# Patient Record
Sex: FEMALE | Race: WHITE
[De-identification: names, ages, dates, MRNs, and addresses within clinical notes are randomized per-mention and may not be internally consistent; named-entity substitution may affect disease eponyms.]

---

## 2019-06-10 NOTE — ED
GI/ HPI





- HPI Summary


HPI Summary: 


49-year-old female presents with lower abdominal pain. for the past week.  pain 

has been increasing.    States she's been having loose stool.  She admits to of 

decreased appetite.  Admits to some urgency and frequency but no dysuria.  She 

has been having nausea but no vomiting.  No hematuria.  Never this pain before.

  No previous surgeries.  Has no medical conditions.  tried Pepto-Bismol for 

her symptoms today.  





- History of Current Complaint


Chief Complaint: EDAbdPain


Time Seen by Provider: 06/10/19 17:56


Stated Complaint: ABD PAIN PER PT


Hx Last Menstrual Period: hysterectomy


Pain Intensity: 8





- Allergy/Home Medications


Allergies/Adverse Reactions: 


 Allergies











Allergy/AdvReac Type Severity Reaction Status Date / Time


 


No Known Allergies Allergy   Verified 06/10/19 14:42














PMH/Surg Hx/FS Hx/Imm Hx


Endocrine/Hematology History: 


   Denies: Hx Diabetes, Hx Thyroid Disease


Cardiovascular History: 


   Denies: Hx Hypertension, Hx Pacemaker/ICD


Respiratory History: 


   Denies: Hx Asthma, Hx Chronic Obstructive Pulmonary Disease (COPD)


GI History: 


   Denies: Hx Ulcer


 History: 


   Denies: Hx Dialysis, Hx Renal Disease


Sensory History: 


   Denies: Hx Hearing Aid


Psychiatric History: 


   Denies: Hx Panic Disorder





- Cancer History


Hx Chemotherapy: No


Hx Radiation Therapy: No





- Surgical History


Surgery Procedure, Year, and Place: left wrist repair 2012.  hysterecomy


Infectious Disease History: No


Infectious Disease History: 


   Denies: Hx Hepatitis, Hx Human Immunodeficiency Virus (HIV), Traveled 

Outside the [place] in Last 30 Days





- Family History


Known Family History: Positive: Hypertension





- Social History


Alcohol Use: Weekly


Substance Use Type: Reports: None


Smoking Status (MU): Never Smoked Tobacco





Review of Systems


Negative: Fever


Negative: Chest Pain


Negative: Shortness Of Breath


Positive: Abdominal Pain, Diarrhea, Nausea.  Negative: Vomiting


All Other Systems Reviewed And Are Negative: Yes





Physical Exam


Triage Information Reviewed: Yes


Vital Signs On Initial Exam: 


 Initial Vitals











Temp Pulse Resp BP Pulse Ox


 


 98.5 F   79   18   112/81   99 


 


 06/10/19 17:08  06/10/19 17:08  06/10/19 17:08  06/10/19 17:08  06/10/19 17:08











Vital Signs Reviewed: Yes


Appearance: Positive: Well-Appearing


Skin: Positive: Warm, Dry


Head/Face: Positive: Normal Head/Face Inspection


Eyes: Positive: Normal, Conjunctiva Clear


ENT: Positive: Pharynx normal


Respiratory/Lung Sounds: Positive: Clear to Auscultation, Breath Sounds Present


Cardiovascular: Positive: Normal, RRR


Abdomen Description: Positive: Soft, Other: - tenderness LLQ


Bowel Sounds: Positive: Present


Musculoskeletal: Positive: Normal


Neurological: Positive: Normal


Psychiatric: Positive: Normal





Diagnostics





- Vital Signs


 Vital Signs











  Temp Pulse Resp BP Pulse Ox


 


 06/10/19 18:00   79    100


 


 06/10/19 17:53   76    99


 


 06/10/19 17:08  98.5 F  79  18  112/81  99














- Laboratory


Lab Results: 


 Lab Results











  06/10/19 06/10/19 06/10/19 Range/Units





  18:15 18:15 18:15 


 


WBC  10.0    (3.5-10.8)  10^3/uL


 


RBC  4.20    (3.70-4.87)  10^6 /uL


 


Hgb  13.2    (12.0-16.0)  g/dL


 


Hct  39    (35-47)  %


 


MCV  93    (80-97)  fL


 


MCH  31    (27-31)  pg


 


MCHC  34    (31-36)  g/dL


 


RDW  14    (10-15)  %


 


Plt Count  229    (150-450)  10^3/uL


 


MPV  8.6    (7.4-10.4)  fL


 


Neut % (Auto)  76.6    %


 


Lymph % (Auto)  14.9    %


 


Mono % (Auto)  7.4    %


 


Eos % (Auto)  0.6    %


 


Baso % (Auto)  0.5    %


 


Absolute Neuts (auto)  7.7    (1.5-7.7)  10^3/ul


 


Absolute Lymphs (auto)  1.5    (1.0-4.8)  10^3/ul


 


Absolute Monos (auto)  0.7    (0-0.8)  10^3/ul


 


Absolute Eos (auto)  0.1    (0-0.6)  10^3/ul


 


Absolute Basos (auto)  0.1    (0-0.2)  10^3/ul


 


Absolute Nucleated RBC  0.0    10^3/ul


 


Nucleated RBC %  0.0    


 


Sodium   136   (135-145)  mmol/L


 


Potassium   3.6   (3.5-5.0)  mmol/L


 


Chloride   100 L   (101-111)  mmol/L


 


Carbon Dioxide   30   (22-32)  mmol/L


 


Anion Gap   6   (2-11)  mmol/L


 


BUN   10   (6-24)  mg/dL


 


Creatinine   0.68   (0.51-0.95)  mg/dL


 


Est GFR ( Amer)   110.4   (>60)  


 


Est GFR (Non-Af Amer)   91.2   (>60)  


 


BUN/Creatinine Ratio   14.7   (8-20)  


 


Glucose   77   ()  mg/dL


 


Lactic Acid    0.5  (0.5-2.0)  mmol/L


 


Calcium   10.2   (8.6-10.3)  mg/dL


 


Total Bilirubin   0.80   (0.2-1.0)  mg/dL


 


AST   14   (13-39)  U/L


 


ALT   10   (7-52)  U/L


 


Alkaline Phosphatase   53   ()  U/L


 


C-Reactive Protein   32.57 H   (<8.01)  mg/L


 


Total Protein   7.8   (6.4-8.9)  g/dL


 


Albumin   4.5   (3.2-5.2)  g/dL


 


Globulin   3.3   (2-4)  g/dL


 


Albumin/Globulin Ratio   1.4   (1-3)  


 


Lipase   15   (11.0-82.0)  U/L











Result Diagrams: 


 06/10/19 18:15





 06/10/19 18:15


Lab Statement: Any lab studies that have been ordered have been reviewed, and 

results considered in the medical decision making process.





- CT


  ** abd


CT Interpretation Completed By: Radiologist


Summary of CT Findings: IMPRESSION:  1. Proximal sigmoid colon diverticulitis. 

No perforation or abscess.  2. Bosniak type I renal cyst. No followup indicated.





Re-Evaluation





- Re-Evaluation


  ** First Eval


Re-Evaluation Time: 17:50


Change: Improved


Comment: feeling better





  ** Second Eval


Re-Evaluation Time: 21:45


Comment: discussed results





GIGU Course/Dx





- Course


Course Of Treatment: 49-year-old female presents with lower abdominal pain. for 

the past week.  pain has been increasing.    States she's been having loose 

stool.  She admits to of decreased appetite.  Admits to some urgency and 

frequency but no dysuria.  She has been having nausea but no vomiting.  No 

hematuria.  Never this pain before.  No previous surgeries.  Has no medical 

conditions.  tried Pepto-Bismol for her symptoms today.  On exam tenderness 

left lower quadrant.  White blood count normal.  CRP elevated. urine normal. CT 

shows diverticulitis. will treat with augmentin and flagyl. patient understand 

and agrees with plan.





- Diagnoses


Differential Diagnoses - Female: Appendicitis, Diverticulitis, Urinary Tract 

Infection


Provider Diagnoses: 


 Diverticulitis








Discharge





- Sign-Out/Discharge


Documenting (check all that apply): Patient Departure


Patient Received Moderate/Deep Sedation with Procedure: No





- Discharge Plan


Condition: Good


Disposition: HOME


Prescriptions: 


Amoxicillin/Clavulanate TAB* [Augmentin *] 875 mg PO BID #19 tab


metroNIDAZOLE [Flagyl 500 MG TAB] 500 mg PO TID #29 tab


Ondansetron ODT TAB* [Zofran 4 MG Odt TAB*] 4 mg PO Q6H PRN #16 tab.odt


 PRN Reason: Nausea


Patient Education Materials:  Diverticulitis (ED), Diverticulitis Diet (ED)


Referrals: 


Omega Whitt MD [Primary Care Provider] - 


Additional Instructions: 


Take augmentin twice a day for 10 days, first dose given in ED


Take Flagyl every 8 hours for 10 days, first dose given in ED


Take Zofran every 6 hours for nausea


Follow clear liquid diet until symptoms improve


Return to ED if unable to keep anything down, develop fever, or any new or 

worsening symptoms





- Billing Disposition and Condition


Condition: GOOD


Disposition: Home

## 2019-06-10 NOTE — UC
Abdominal Pain Female HPI





- HPI Summary


HPI Summary: 





50 yo female presents with lower abdominal pain for 1 week. She tells me that 

her pain started lower abdominal and was mild in nature. Yesterday and today 

her pain has significantly worsened. She reports 3-4 episodes of loose stools 

each day throughout the week. She states she has had UTIs in the past that have 

felt similar, but she is not having any urinary burning, frequency, or 

pressure. She is eating and drinking well, but does feel a little nauseous. 

Denies SOB, chest pain, vomiting, blood in stool, dysuria, flank pain, vaginal 

bleeding or discharge. She has had a hysterectomy, but still has both ovaries. 











- History of Current Complaint


Chief Complaint: UCAbdominalPain


Stated Complaint: SEVERE STOMACH PAIN


Time Seen by Provider: 06/10/19 14:59


Hx Last Menstrual Period: hysterectomy


Onset/Duration: Gradual Onset


Severity Initially: Mild


Severity Currently: Moderate


Pain Intensity: 6


Pain Scale Used: 0-10 Numeric


Allergies/Adverse Reactions: 


 Allergies











Allergy/AdvReac Type Severity Reaction Status Date / Time


 


No Known Allergies Allergy   Verified 06/10/19 14:42











Home Medications: 


 Home Medications





Bismuth Subsalicylate [Pepto-Bismol] 3 tab PO ONCE PRN 06/10/19 [History 

Confirmed 06/10/19]











PMH/Surg Hx/FS Hx/Imm Hx





- Additional Past Medical History


Additional PMH: 





None





- Surgical History


Surgical History: Yes


Surgery Procedure, Year, and Place: left wrist repair 2012.  hysterecomy





- Family History


Known Family History: Positive: Hypertension





- Social History


Alcohol Use: Weekly


Substance Use Type: None


Smoking Status (MU): Never Smoked Tobacco





Review of Systems


All Other Systems Reviewed And Are Negative: Yes


Constitutional: Positive: Negative


Skin: Positive: Negative


Respiratory: Positive: Negative


Cardiovascular: Positive: Negative


Gastrointestinal: Positive: Abdominal Pain, Diarrhea, Nausea


Genitourinary: Positive: Negative


Neurovascular: Positive: Negative


Neurological: Positive: Negative


Psychological: Positive: Negative





Physical Exam





- Summary


Physical Exam Summary: 





GENERAL: NAD. WDWN. No pain distress.


SKIN: No rashes, sores, lesions, or open wounds.


NECK: Supple. Nontender. No lymphadenopathy. 


CHEST:  CTAB. No r/r/w. No accessory muscle use. Breathing comfortably and in 

no distress.


CV:  RRR. Without m/r/g. Pulses intact. Cap refill <2seconds


ABDOMEN: Mild TTP LLQ, superpubic, and RLQ. Soft. No distention or guarding. No 

CVA tenderness. Bowel sounds present


NEURO: Alert. 


PSYCH: Age appropriate behavior.


Triage Information Reviewed: Yes


Vital Signs: 


 Initial Vital Signs











Temp  98 F   06/10/19 14:37


 


Pulse  71   06/10/19 14:37


 


Resp  14   06/10/19 14:37


 


BP  113/79   06/10/19 14:37


 


Pulse Ox  100   06/10/19 14:37








 Laboratory Tests











  06/10/19





  14:53


 


POC Urine Color  Light yellow


 


POC Urine Clarity  Clear


 


POC Urine pH  5.5


 


POC Ur Specif Gravity  <= 1.005 L


 


POC Urine Protein  Negative


 


POC Ur Glucose (UA)  Negative


 


POC Urine Ketones  Negative


 


POC Urine Blood  Trace-lysed A


 


POC Urine Nitrite  Negative


 


POC Urine Bilirubin  Negative


 


POC Urine Urobilinogen  0.2


 


POC U Leukocyte Esteras  Negative











Vital Signs Reviewed: Yes





Abd Pain Female Course/Dx





- Course


Course Of Treatment: 





U/S: 


IMPRESSION:


1. STATUS POST HYSTERECTOMY.


2. SMALL CYSTS WITHIN THE RIGHT OVARY MOST CONSISTENT WITH FOLLICULAR CYSTS.





Discussed results with pt. Given that she is having loose stools and nausea, I 

am not confident that these ovarian cysts are the sole cause of her discomfort 

- therefore I have recommended that she go to the ED for further evaluation of 

her abdominal pain as I believe she requires a CT scan with po contrast. Pt was 

agreeable with this.





- Differential Dx/Diagnosis


Provider Diagnosis: 


 Lower abdominal pain, Loose stools








Discharge





- Sign-Out/Discharge


Documenting (check all that apply): Patient Departure


All imaging exams completed and their final reports reviewed: Yes





- Discharge Plan


Condition: Stable


Disposition: HOME-RECOMMEND TO ED


Referrals: 


Omega Whitt MD [Primary Care Provider] - 


Additional Instructions: 


Your ultrasound showed an ovarian cyst. This could be the cause of your pain, 

but your symptoms seem to support a potential GI pathology - therefore I 

recommend that you go to the ER for further evaluation and a more appropriate 

work up of your abdominal pain. This may include a CT scan with oral and IV 

contrast.





- Billing Disposition and Condition


Condition: STABLE


Disposition: Home-Recommend to ED

## 2019-07-04 ENCOUNTER — HOSPITAL ENCOUNTER (EMERGENCY)
Dept: HOSPITAL 25 - UCEAST | Age: 49
Discharge: HOME | End: 2019-07-04
Payer: COMMERCIAL

## 2019-07-04 VITALS — DIASTOLIC BLOOD PRESSURE: 76 MMHG | SYSTOLIC BLOOD PRESSURE: 109 MMHG

## 2019-07-04 DIAGNOSIS — R05: Primary | ICD-10-CM

## 2019-07-04 PROCEDURE — G0463 HOSPITAL OUTPT CLINIC VISIT: HCPCS

## 2019-07-04 PROCEDURE — 99212 OFFICE O/P EST SF 10 MIN: CPT

## 2019-07-04 NOTE — XMS REPORT
Continuity of Care Document (CCD)

 Created on:2019



Patient:Danny Pate

Sex:Female

:1970

External Reference #:[id number]





Demographics







 Address  [address] [place] [address]

 

 Mobile Phone  [phone]

 

 Email Address  [email or web address]

 

 Preferred Language  en

 

 Marital Status  Not  or 

 

 Adventism Affiliation  Unknown

 

 Race  White

 

 Ethnic Group  Not  or 









Author







 Name  Kathy Cunningham









Care Team Providers







 Name  Role  Phone

 

 Omega Whitt MD  Primary Care Physician  Unavailable









Payers







 Date  Identification Numbers  Payment Provider  Subscriber

 

   Policy Number: [id number]  Gary Pate









 PayID: [id number]  [address]









 [place] [address]







Problems







 Active Problems  Provider  Date

 

 Uterine leiomyoma  Elizabeth Acosta N.PSarah  Onset: 2016







Family History







 Date  Family Member(s)  Observation  Comments

 

   General  Arthritis  

 

   Father  Hypertension  Age 67

 

   Father  PGF - stroke  

 

   Mother  Alive And Well  Age 67

 

   Siblings  None  







Social History







 Type  Date  Description  Comments

 

 Birth Sex    Unknown  

 

 Marital Status      

 

 Lives With      

 

 Occupation      

 

 ETOH Use    Currently consumes alcohol  2 - 6 per week

 

 Tobacco Use  Start: Unknown  Patient has never smoked  

 

 Recreational Drug Use    Negative For Denies Drug  



     Use  

 

 Smoking Status  Reviewed: 19  Patient has never smoked  

 

 Exercise Type/Frequency    Exercises sporadically  







Allergies, Adverse Reactions, Alerts







 Description

 

 No Known Drug Allergies







Medications







 Active Medications  SIG  Qnty  Indications  Ordering  Date



         Provider  

 

 Metronidazole  take 1 tablet by      Unknown  



             500mg  mouth three times a        



 Tablets  day for 10 days        



           

 

 Amoxicillin/Clavulana  take 1 tablet by      Unknown  



 te Potassium  mouth twice a day        



            875-125mg  for 10 days        



 Tablets          



           

 

 Vitamin B 12  1 sl every day      Unknown  



            100mcg          



 Lozenges          



           

 

 Magnesium  1-2 by mouth every      Unknown  



         300mg  day        



 Capsules          



           

 

 Hot Flash Relief  2 Tablets Twice A      Unknown  



 500MG  Day        

 

 Deep Rest        Unknown  

 

 Black Cohosh  1 by mouth every day      Unknown  



            40mg          



 Capsules          



           

 

 Ibuprofen  by mouth three times      Unknown  



         800mg Tablets  a day as needed        



           

 

 Colace  2 caps po twice      Unknown  



      100mg Capsules  daily        



           

 

 Vitamin D3  twice a week      Unknown  



          5000Unit/ML          



 Liquid          



           

 

 Omega III  1 by mouth      Unknown  



         1000mg  occasionally        



 Capsules          



           









 History Medications









 Lact-Enz        Unknown   - 2016

 

 Utrophin        Unknown   - 2016

 

 Cardio Complete        Unknown   - 2016



          Capsules          



           

 

 Oxycodone-Acetaminophen  take 1 to 2 tablets by      Unknown   - 2019



                 5-325mg  mouth every 6 to 8        



 Tablets  hours as needed pain        

 

 Megestrol  1 by mouth bid      Unknown   - 2016



   40mg Tablets          



           

 

 Intelligent  Blend  1 by mouth daily (on      Unknown   - 2016



             Tablets  hold)        



           

 

 Progesterone Micronized  1 by mouth every day      Unknown   - 2016



                 85G  Day 12-26        



 Capsules          

 

 Smooth Cycle  1 po qd      Unknown   - 2016

 

 B12-Active  1 po qd      Unknown   - 2016

 

 Iron  1 by mouth every day      Unknown   - 2019



 325(65Fe) mg Tablets          



           

 

 Oxycodone-Acetaminophen        Unknown   - 2016



                 5-325mg          



 Tablets          

 

 Sulfamethoxazole/Trimethopr        Unknown   - Unknown



 im DS          



 800-160mg Tablets          



           







Vital Signs







 Date  Vital  Result  Comment

 

 2019  1:33pm  Height  64 inches  5'4"









 Weight  118.00 lb  

 

 Heart Rate  68 /min  

 

 BP Systolic  113 mmHg  

 

 BP Diastolic  69 mmHg  

 

 O2 % BldC Oximetry  97 %  

 

 BMI (Body Mass Index)  20.3 kg/m2  









 2019  2:45pm  Height  64 inches  5'4"









 Weight  116.00 lb  

 

 Heart Rate  59 /min  

 

 BP Systolic  141 mmHg  

 

 BP Diastolic  92 mmHg  

 

 Body Temperature  97.7 F  

 

 O2 % BldC Oximetry  100 %  

 

 BMI (Body Mass Index)  19.9 kg/m2  









 2016  9:34am  Height  64 inches  5'4"









 Weight  137.75 lb  w/shoes

 

 Heart Rate  84 /min  

 

 BP Systolic Sitting  96 mmHg  Ra reg cuff

 

 BP Diastolic Sitting  72 mmHg  Ra reg cuff

 

 BMI (Body Mass Index)  23.6 kg/m2  

 

 Ejection Fraction  55-60%  Echo 3/18/16









 2016 10:02am  Height  64 inches  5'4"









 Weight  143.00 lb  

 

 Heart Rate  84 /min  

 

 BP Systolic Sitting  100 mmHg  

 

 BP Diastolic Sitting  70 mmHg  

 

 Respiratory Rate  14 /min  

 

 Pain Level  2  

 

 BMI (Body Mass Index)  24.5 kg/m2  









 2016 10:59am  Height  64 inches  5'4"









 Weight  140.12 lb  

 

 Heart Rate  84 /min  

 

 BP Systolic Sitting  104 mmHg  

 

 BP Diastolic Sitting  70 mmHg  

 

 Pain Level  3  

 

 BMI (Body Mass Index)  24.0 kg/m2  









 2016  8:43am  Height  67 inches  5'7"









 Weight  139.00 lb  with shoes

 

 Heart Rate  78 /min  

 

 BP Systolic Sitting  57215 mmHg  reg cuff LA

 

 BMI (Body Mass Index)  21.8 kg/m2  









 2016  3:24pm  Height  67 inches  5'7"









 Weight  137.00 lb  

 

 Heart Rate  76 /min  

 

 BP Systolic Sitting  112 mmHg  

 

 BP Diastolic Sitting  82 mmHg  

 

 BMI (Body Mass Index)  21.5 kg/m2  









 2016  9:57am  Weight  137.00 lb  









 Heart Rate  70 /min  

 

 BP Systolic Sitting  110 mmHg  

 

 BP Diastolic Sitting  62 mmHg  

 

 Respiratory Rate  15 /min  

 

 Body Temperature  98.2 F  

 

 O2 % BldC Oximetry  98 %  







Results







 Test  Date  Facility  Test  Result  H/L  Range  Note

 

 Laboratory test  2019  [facility]  Gardnerella/Y  <pending>  
    



 finding    [address]: Vaginal        



     [place] [address]  Dna        



     [phone]          

 

 Urine Culture And  06/10/2019  [facility]  Urine Culture  SEE 
RESULT      1, 2



 Sensitivities    [address]    [place]ELOW      



     [place] [address] [phone]          

 

 Urinalysis Profile  06/10/2019  [facility]  Urine Color  Straw      



     [address]          



     [place] [address] [phone]          









 Urine Appearance  Clear      

 

 Urine Specific Gravity  1.004  Low  1.010-1.030  

 

 Urine pH  6.0  N  5-9  

 

 Urine Urobilinogen  Negative    Negative  

 

 Urine Ketones  Negative    Negative  

 

 Urine Protein  Negative    Negative  

 

 Urine Leukocytes  Negative    Negative  

 

 Urine Blood  1+  Abnormal  Negative  

 

 Urine Nitrite  Negative    Negative  

 

 Urine Bilirubin  Negative    Negative  

 

 Urine Glucose  Negative    Negative  

 

 Urine White Blood Cell  Absent    Absent  

 

 Urine Red Blood Cell  Trace(0-2/hpf)    Absent  

 

 Urine Bacteria  1+  Abnormal  Absent  

 

 Urine Squamous Epithelial Cell  Present  Abnormal  Absent  









 Laboratory test finding  06/10/2019  [facility]  Lipase  15 U/L  N  
11.0-82.0  



     [address]          



     [place] [address] [phone]          









 C Reactive Protein  32.57 mg/L  High  <8.01  









 Comp Metabolic Panel  06/10/2019  [facility]  Sodium  136 mmol/L  N
  135-145  



     [address] [place] [address] [phone]          









 Potassium  3.6 mmol/L  N  3.5-5.0  

 

 Chloride  100 mmol/L  Low  101-111  

 

 Co2 Carbon Dioxide  30 mmol/L  N  22-32  

 

 Anion Gap  6 mmol/L  N  2-11  

 

 Glucose  77 mg/dL  N    

 

 Blood Urea Nitrogen  10 mg/dL  N  6-24  

 

 Creatinine  0.68 mg/dL  N  0.51-0.95  

 

 BUN/Creatinine Ratio  14.7  N  8-20  

 

 Calcium  10.2 mg/dL  N  8.6-10.3  

 

 Total Protein  7.8 g/dL  N  6.4-8.9  

 

 Albumin  4.5 g/dL  N  3.2-5.2  

 

 Globulin  3.3 g/dL  N  2-4  

 

 Albumin/Globulin Ratio  1.4  N  1-3  

 

 Total Bilirubin  0.80 mg/dL  N  0.2-1.0  

 

 Alkaline Phosphatase  53 U/L  N    

 

 Alt  10 U/L  N  7-52  

 

 Ast  14 U/L  N  13-39  

 

 Egfr Non-  91.2    >60  

 

 Egfr   110.4    >60  3









 Laboratory test  06/10/2019  [facility]  Lactic Acid  0.5 mmol/L  N
  0.5-2.0  4



 finding    [address] [place] [address] [phone]          

 

 CBC Auto Diff  06/10/2019  [facility]  White Blood  10.0 10^3/uL  N
  3.5-10.8  



     [address]  [facility]  Count        



     [place] [address] [phone]          









 Red Blood Count  4.20 10^6/uL  N  3.70-4.87  

 

 Hemoglobin  13.2 g/dL  N  12.0-16.0  

 

 Hematocrit  39 %  N  35-47  

 

 Mean Corpuscular Volume  93 fL  N  80-97  

 

 Mean Corpuscular Hemoglobin  31 pg  N  27-31  

 

 Mean Corpuscular HGB Conc  34 g/dL  N  31-36  

 

 Red Cell Distribution Width  14 %  N  10-15  

 

 Platelet Count  229 10^3/uL  N  150-450  

 

 Mean Platelet Volume  8.6 fL  N  7.4-10.4  

 

 Abs Neutrophils  7.7 10^3/uL  N  1.5-7.7  

 

 Abs Lymphocytes  1.5 10^3/uL  N  1.0-4.8  

 

 Abs Monocytes  0.7 10^3/uL  N  0-0.8  

 

 Abs Eosinophils  0.1 10^3/uL  N  0-0.6  

 

 Abs Basophils  0.1 10^3/uL  N  0-0.2  

 

 Abs Nucleated RBC  0.0 10^3/uL      

 

 Granulocyte %  76.6 %      

 

 Lymphocyte %  14.9 %      

 

 Monocyte %  7.4 %      

 

 Eosinophil %  0.6 %      

 

 Basophil %  0.5 %      

 

 Nucleated Red Blood Cells %  0.0      









 Connective Tissue  2016  [facility]  Anti-Nuclear Antibody  
0.7 U  N    5



 Panel    [address] [place] [address] [phone]          









 Cyclic Citrullinated Peptide  <15.6 U  N    6

 

 Interpretation  See Comment  N    7









 Laboratory test  2016  [facility]  Ferritin  < 10.0 ng/mL  
Low    



 finding    [address] [place] [address] [phone]          









 Folic Acid (Folate)  > 20.00 ng/mL  N  >3.99  

 

 Vitamin B12  446 pg/mL  N  180-914  8









 Iron & Iron Binding  2016  [facility]  Iron  53 g/dL  N  50-
212  



 Capacity    [address] [place] [address] [phone]          









 Unsaturated Iron Binding  458 g/dL  N    

 

 Total Iron Binding Capacity  511 g/dL  High  250-450  

 

 % Iron Saturation  10 %  Low  15-55  









 Laboratory test  2016  [facility]  LDH  131 U/L  Low  140-271
  



 finding    [address] [place] [address] [phone]          

 

 Retic Count  2016  [facility]  Retic Count  0.9 %  N  0.5-1.5
  



     [address] [place] [address] [phone]          









 Corrected Retic Count  0.6 %  N  0.5-1.5  

 

 Maturation Factor Retic  1.5  N    

 

 Retic Index  0.40  N    

 

 Mean Retic Volume  111.0  N    

 

 Immature Retic Fraction  0.36  N    

 

 RBC Retic Count  3.50 10^6/uL  Low  4.6-6.2  

 

 Hematocrit for Retic CNT  31 %  Low  35-47  









 Anti-Thyroid  2016  [facility]  Thyroperoxidase AB  0.00 IU/
mL  N  <9  9



 Antibodies Screen    [address]          



     [place] [address] [phone]          









 Thyroglobulin AB  <1.8 IU/mL  N  <4.0  10









 Protein  2016  [facility]  Total  8.0  Abnormal  6.3 -  



 Electrophoresis    [address]  Protein(Pep)  g/dL    7.9  



     [place] [address] [phone]          









 Albumin  4.0 g/dL  N  3.4-4.7  

 

 Alpha-1 Globulin  0.2 g/dL  N  0.1-0.3  

 

 Alpha-2 Globulin  1.0 g/dL  N  0.6-1.0  

 

 Beta Globulin  1.2 g/dL  N  0.7-1.2  

 

 Gamma Globulin  1.6 g/dL  N  0.6-1.6  

 

 Albumin/Globulin Ratio  1.00  N    

 

 Impression  See Comment  N    11









 Laboratory test  2016  [facility]  Angiotensin  18 U/L  N  8 
- 53  12



 finding    [address]  Converting Enzyme        



     [place], [place] [address] [phone]          

 

 Celiac Panel  2016  [facility]  Tissue  <1.2  N    13



     [address]  Transglutaminase IgA  U/mL      



     [place] [address]  Ab        



     [phone]          









 Immunoglobulin A  359 mg/dL  Abnormal  61 - 356  

 

 Celiac Interpretation  See Comment  N    14









 Laboratory test  2016  [facility]  Serotonin Serum  127  N  <=
230  15



 finding    [address]    ng/mL      



     [place], [place] [address] [phone]          

 

 Scleroderma AB  2016  [facility]  Scleroderma Ab  2.2 U  
Abnormal    16



 (SCL70)    [address]          



     [place] [address] [phone]          

 

 Laboratory test  2016  [facility]  Erythrocyte Sed  24 mm/Hr  
High  0-14  



 finding    [address]  Rate        



     [place] [address] [phone]          









 Creatine Kinase(CK)  59 U/L  N    

 

 C Reactive Protein  1.26 mg/L  N  < 5.00  17









 Neutrophil Cytoplasmic  2016  [facility]  C-Anca  Negative  N
  Negative  



 AB    [address]          



     [place] [address] [phone]          









 P Anca  Negative  N  Negative  

 

 Anca Reviewed By MD  Ignacio Sudilovsk <SEE NOTE>  N    18









 Cardiolipin  2016  [facility]  Phospholipid Ab  < 4.0 MPL  N 
   19



 Igg/Igm    [address]  IgM, S        



     [place], [place] [address] [phone]          









 Phospholipid Ab IgG  6.4 GPL  N    20









 1  Pt was evaluated in ER , had diverticulitis.

 

 2  SEE RESULT BELOW



   -----------------------------------------------------------------------------
---------------



   Name:  DANNY PATE              : 1970    Attend Dr: Woodrow Arrieta MD



   Acct:  [id number]  Unit: [id number]  AGE: 49            Location:  [facility]



   Re/10/19                        SEX: F             Status:    DEP ER



   -----------------------------------------------------------------------------
---------------



   



   SPEC: [id number]         ISA:   06/10/    REKHA DR: Lilia LOONEY



   REQ:  [id number]              RECD:   06/10/



   STATUS: AMA DE [place] ROS[place] DR: Erika Whitt MD PC



   Woodrow Arrieta MD



   _



   SOURCE: URINE          SPDESC:



   ORDERED:  Urine Culture



   



   -----------------------------------------------------------------------------
---------------



   Procedure                         Result                         Reported   
        Site



   -----------------------------------------------------------------------------
---------------



   Urine Culture  Final                                             19-
1614      ML



   No Growth (<1,000 CFU/mL)



   



   -----------------------------------------------------------------------------
---------------



   * ML - Main Lab



   .



   



   



   



   



   



   



   



   



   



   



   



   



   



   



   



   



   



   



   



   



   



   



   



   



   



   ** END OF REPORT **



   



   DEPARTMENT OF PATHOLOGY,  [address] [place] [address]



   Phone # [phone]      Fax #[phone]



   Ignacio Cole M.D. Director     [facility] # [id number]

 

 3  *******Because ethnic data is not always readily available,



   this report includes an eGFR for both -Americans and



   non- Americans.****



   The National Kidney Disease Education Program (NKDEP) does



   not endorse the use of the MDRD equation for patients that



   are not between the ages of 18 and 70, are pregnant, have



   extremes of body size, muscle mass, or nutritional status,



   or are non- or non-.



   According to the National Kidney Foundation, irrespective of



   diagnosis, the stage of the disease is based on the level of



   kidney function:



   Stage Description                      GFR(mL/min/1.73 m(2))



   1     Kidney damage with normal or decreased GFR       90



   2     Kidney damage with mild decrease in GFR          60-89



   3     Moderate decrease in GFR                         30-59



   4     Severe decrease in GFR                           15-29



   5     Kidney failure                       <15 (or dialysis)

 

 4  [facility] Severe Sepsis and Septic Shock Management Bundle Measure



   requires all lactic acids initially measuring >2.0 mmol/L be



   repeated.

 

 5  -------------------REFERENCE VALUE--------------------------



   <=1.0 (Negative)

 

 6  -------------------REFERENCE VALUE--------------------------



   <20.0 (Negative)

 

 7  Tests for antibodies to dsDNA and CALVIN antigens are not



   performed automatically unless the VIOLETA result is > or=



   3.0 U.  Studies performed at [facility] indicate that



   positive VIOLETA results <3.0 U are rarely accompanied by



   positive second order tests.



   Test Performed by:



   [place] [address]



   : Pavel Shah II, M.D., Ph.D.

 

 8  Normal Range 180 to 914



   Indeterminate Range 145 to 180



   Deficient Range  <145

 

 9  Results verified by repeat analysis on the sample.



   Repeated result is:0.00

 

 10  -------------------ADDITIONAL INFORMATION-------------------



   The thyroglobulin antibody testing method is an



   immunoenzymatic assay manufactured by [employer]



   and performed on the [employer] .



   Values obtained from different assay methods or kits



   may be different and cannot be used interchangeably.



   The results cannot be interpreted as absolute evidence



   for the presence or absence of malignant disease.



   Test Performed by:



   [place] [address]



   : Pavel Shah II, M.D., Ph.D.

 

 11  RESULT: No apparent monoclonal protein on serum electrophoresis.



   Test Performed by:



   [place] [address]



   : Pavel Shah II, M.D., Ph.D.

 

 12  Test Performed by:



   [place] [address]



   : Pavel Shah II, M.D., Ph.D.

 

 13  -------------------REFERENCE VALUE--------------------------



   <4.0 (Negative)



   Test Performed by:



   [place] [address]



   : Pavel Shah II, M.D., Ph.D.

 

 14  Negative serology. Celiac disease unlikely. However,



   approximately 10% of patients with celiac disease are



   seronegative. Also, patients who are already adhering to a



   gluten-free diet may be seronegative. If celiac disease is



   highly clinically suspected, consider HLA-DQ typing.



   Test Performed by:



   [place] [address]



   : Pavel Shah II, M.D., Ph.D.

 

 15  Test Performed by:



   [place] [address]



   : Pavel Shah II, M.D., Ph.D.

 

 16  Interpretation: Positive (>=1.0)



   -------------------REFERENCE VALUE--------------------------



   <1.0 (Negative)



   Test Performed by:



   [place] [address]



   : Pavel Shah II, M.D., Ph.D.

 

 17  Acute inflammation:  >10.00

 

 18  Ignacio Cole

 

 19  -------------------REFERENCE VALUE--------------------------



   <10.0 (Negative)

 

 20  -------------------REFERENCE VALUE--------------------------



   <10.0 (Negative)



   Test Performed by:



   [place] [address]



   : Pavel Shah II, M.D., Ph.D.







Procedures







 Date  Code  Description  Status

 

 2018  23876168  Mammogram  Completed

 

 2016  90187  Holter Monitor Review (24 hr)dr avsquez & interp only  
Completed

 

 2016  82921  ECG Monitor/Recording W/Visual Superimposition Scanning  
Completed

 

 2016  51396  ECHO Stress Test Incl Perf Contiuous ekg Monitoring  
Completed



     W/Phys Superv  

 

 2016  35492  ECHO Transthoracic, Real-Time 2D With Doppler And Color  
Completed



     Flow  

 

 2016  33862  EKG Tracing & Interpretation  Completed







Encounters







 Type  Date  Location  Provider  Dx  Diagnosis

 

 Office Visit  2019  [facility]  N95.1  Menopausal and



   2:30p  [facility]  AURE Lopez    female climacteric



           states









 F43.20  Adjustment disorder, unspecified

 

 E55.9  Vitamin D deficiency, unspecified

 

 R76.0  Raised antibody titer

 

 R53.83  Other fatigue

 

 Z87.820  Personal history of traumatic brain injury









 Office Visit  2016  9:40a  [facility]  Qutaybeh S.  R00.2  
Palpitations



       LI Ramirez    









 R00.0  Tachycardia, unspecified

 

 I73.00  Raynaud's syndrome without gangrene

 

 I49.3  Ventricular premature depolarization

 

 I49.1  Atrial premature depolarization









 Office Visit  2016 10:00a  [facility]  Aditya Hatfield  I73.00  Raynaud'
s



     [facility]  SOBIADSarah    syndrome



           without



           gangrene









 R76.0  Raised antibody titer









 Office Visit  2016 11:00a  Rheumatology  Aditya Hatfield  I73.00  Raynaud'
s



     [facility]  SOBIADSarah    syndrome



           without



           gangrene









 R76.0  Raised antibody titer

 

 R70.0  Elevated erythrocyte sedimentation rate

 

 R00.2  Palpitations

 

 L13.0  Dermatitis herpetiformis









 Office Visit  2016  9:00a  [facility]  Qutaybeh S.  R00.2  
Palpitations



       LI Ramirez    









 R07.9  Chest pain, unspecified

 

 R00.0  Tachycardia, unspecified

 

 D50.8  Other iron deficiency anemias









 Office Visit  2016  3:00p  [facility] DONATO  D25.9  Leiomyoma of



     [facility]  LI Avila    uterus,



           unspecified

 

 Office Visit  2016 10:00a  [facility]  Elizabeth Acosta,  D25.9  
Leiomyoma of



     Medicine -  N.P.    uterus,



     Ccmob      unspecified







Plan of Treatment

2019 - Monse Donahue, NPN76.0 Acute vaginitisFollow up:I will call you 
tomorrow with results

## 2019-07-04 NOTE — UC
Respiratory Complaint HPI





- HPI Summary


HPI Summary: 


2 DAYS OF COUGH AND MILD EAR PAIN.  NO FEVER, CONGESTION, SORE THROAT, NAUSEA/

VOMITING.  NO SINUS PRESSURE. NO H/O ALLERGIES








- History of Current Complaint


Chief Complaint: UCRespiratory


Stated Complaint: BACK PAIN, AND COUGH


Time Seen by Provider: 07/04/19 14:47


Hx Obtained From: Patient


Hx Last Menstrual Period: hysterectomy


Onset/Duration: Gradual Onset, Lasting Days, Still Present


Timing: Constant


Severity Initially: Mild


Severity Currently: Mild


Pain Intensity: 5


Pain Scale Used: 0-10 Numeric


Character: Cough: Nonproductive


Aggravating Factors: Nothing


Alleviating Factors: Nothing


Associated Signs And Symptoms: Positive: URI.  Negative: Dyspnea, Fever, 

Wheezing, Hemoptysis, Nasal Congestion, Hoarseness, Sinus Discomfort





- Allergies/Home Medications


Allergies/Adverse Reactions: 


 Allergies











Allergy/AdvReac Type Severity Reaction Status Date / Time


 


No Known Allergies Allergy   Verified 07/04/19 13:08














PMH/Surg Hx/FS Hx/Imm Hx


GI/ History: Diverticulitis





- Surgical History


Surgical History: Yes


Surgery Procedure, Year, and Place: left wrist repair 2012.  hysterecomy





- Family History


Known Family History: Positive: Hypertension





- Social History


Alcohol Use: Weekly


Substance Use Type: None


Smoking Status (MU): Never Smoked Tobacco





Review of Systems


All Other Systems Reviewed And Are Negative: Yes


Constitutional: Positive: Negative


ENT: Positive: Ear Ache


Respiratory: Positive: Cough


Cardiovascular: Positive: Negative


Gastrointestinal: Positive: Negative


Genitourinary: Positive: Negative





Physical Exam


Triage Information Reviewed: Yes


Appearance: Well-Appearing, No Pain Distress, Well-Nourished


Vital Signs: 


 Initial Vital Signs











Temp  98.8 F   07/04/19 13:04


 


Pulse  81   07/04/19 13:04


 


Resp  18   07/04/19 13:04


 


BP  109/76   07/04/19 13:04


 


Pulse Ox  100   07/04/19 13:04











Vital Signs Reviewed: Yes


Eyes: Positive: Conjunctiva Clear


ENT: Positive: Hearing grossly normal, Pharynx normal, TMs normal


Neck: Positive: Supple, Nontender, No Lymphadenopathy


Respiratory Exam: Normal


Cardiovascular Exam: Normal


Abdomen Description: Positive: Soft.  Negative: CVA Tenderness (R), CVA 

Tenderness (L)


Musculoskeletal: Positive: No Edema


Neurological: Positive: Alert


Psychological: Positive: Age Appropriate Behavior


Skin: Negative: Rashes





Respiratory Course/Dx





- Course


Course Of Treatment: 





NORMAL EXAM. TREAT CONSERVATIVELY. NO ABX. TESSALON ERX. OTC MEDS AS NEEDED. F/

U IF NOT IMPROVING.





- Differential Dx/Diagnosis


Provider Diagnosis: 


 Cough








Discharge





- Sign-Out/Discharge


Documenting (check all that apply): Patient Departure


All imaging exams completed and their final reports reviewed: No Studies





- Discharge Plan


Condition: Stable


Disposition: HOME


Prescriptions: 


Benzonatate CAP* [Tessalon CAP*] 1 - 2 cap PO TID PRN #[id number] cap


 PRN Reason: Cough


Patient Education Materials:  Acute Cough (ED)


Referrals: 


Omega Whitt MD [Primary Care Provider] - If Needed


Additional Instructions: 


YOUR SYMPTOMS ARE LIKELY VIRALLY MEDIATED AND SHOULD RESOLVE ON THEIR OWN WITH 

TIME. NO INDICATION FOR ANTIBIOTICS AT PRESENT. REST, HYDRATE, OTC MEDS AS 

NEEDED. WILL TREAT WITH COUGH MEDICINE. SEEK FOLLOW-UP IF YOU ARE NOT IMPROVING 

OVER THE NEXT 1-2 WEEKS.





- Billing Disposition and Condition


Condition: STABLE


Disposition: Home